# Patient Record
Sex: MALE | Race: BLACK OR AFRICAN AMERICAN | NOT HISPANIC OR LATINO | Employment: UNEMPLOYED | URBAN - METROPOLITAN AREA
[De-identification: names, ages, dates, MRNs, and addresses within clinical notes are randomized per-mention and may not be internally consistent; named-entity substitution may affect disease eponyms.]

---

## 2018-04-14 ENCOUNTER — HOSPITAL ENCOUNTER (EMERGENCY)
Facility: OTHER | Age: 53
Discharge: HOME | End: 2018-04-14
Attending: EMERGENCY MEDICINE
Payer: MEDICAID

## 2018-04-14 VITALS
RESPIRATION RATE: 18 BRPM | WEIGHT: 196 LBS | DIASTOLIC BLOOD PRESSURE: 108 MMHG | OXYGEN SATURATION: 95 % | HEART RATE: 98 BPM | TEMPERATURE: 97 F | SYSTOLIC BLOOD PRESSURE: 171 MMHG | BODY MASS INDEX: 31.5 KG/M2 | HEIGHT: 66 IN

## 2018-04-14 DIAGNOSIS — R03.0 ELEVATED BLOOD PRESSURE READING: ICD-10-CM

## 2018-04-14 DIAGNOSIS — H02.843 SWELLING OF EYELID, RIGHT: Primary | ICD-10-CM

## 2018-04-14 DIAGNOSIS — L30.9 DERMATITIS: ICD-10-CM

## 2018-04-14 PROCEDURE — 99283 EMERGENCY DEPT VISIT LOW MDM: CPT

## 2018-04-14 PROCEDURE — 25000003 PHARM REV CODE 250: Performed by: EMERGENCY MEDICINE

## 2018-04-14 RX ORDER — HYDROCHLOROTHIAZIDE 25 MG/1
25 TABLET ORAL DAILY
Qty: 30 TABLET | Refills: 0 | Status: SHIPPED | OUTPATIENT
Start: 2018-04-14 | End: 2019-04-14

## 2018-04-14 RX ORDER — HYDROCHLOROTHIAZIDE 25 MG/1
25 TABLET ORAL
Status: COMPLETED | OUTPATIENT
Start: 2018-04-14 | End: 2018-04-14

## 2018-04-14 RX ORDER — HYDROCORTISONE 1 %
CREAM (GRAM) TOPICAL
Qty: 30 G | Refills: 0 | Status: SHIPPED | OUTPATIENT
Start: 2018-04-14

## 2018-04-14 RX ORDER — DIPHENHYDRAMINE HCL 25 MG
25 CAPSULE ORAL
Status: COMPLETED | OUTPATIENT
Start: 2018-04-14 | End: 2018-04-14

## 2018-04-14 RX ORDER — HYDROCHLOROTHIAZIDE 25 MG/1
25 TABLET ORAL DAILY
COMMUNITY

## 2018-04-14 RX ADMIN — HYDROCHLOROTHIAZIDE 25 MG: 25 TABLET ORAL at 08:04

## 2018-04-14 RX ADMIN — DIPHENHYDRAMINE HYDROCHLORIDE 25 MG: 25 CAPSULE ORAL at 08:04

## 2018-04-14 NOTE — ED PROVIDER NOTES
Encounter Date: 4/14/2018    SCRIBE #1 NOTE: IHope am scribing for, and in the presence of, Dr. Sheffield.       History     Chief Complaint   Patient presents with    Facial Swelling     Pt reports swelling to right upper lid since this am. Reports discomfort.     Time seen by provider: 8:12 AM    This is a 53 y.o. Male, with a history of HTN, who presents with complaint of facial swelling that began today. Patient reports waking up with right side eyelid swelling. He reports associated eye itching, eye discharge, and vision changes. He admits to wearing eye glasses. He reports rash to the right forearm that developed today. He reports associated itching. He denies fever, chills, congestion, photophobia, headache. He reports noncompliance with antihypertensive medication because he does not have any refills.       The history is provided by the patient.     Review of patient's allergies indicates:  No Known Allergies  Past Medical History:   Diagnosis Date    Hypertension      History reviewed. No pertinent surgical history.  History reviewed. No pertinent family history.  Social History   Substance Use Topics    Smoking status: Never Smoker    Smokeless tobacco: Not on file    Alcohol use Not on file      Comment: occasionally     Review of Systems   Constitutional: Negative for chills and fever.   HENT: Positive for facial swelling (right eyelid). Negative for congestion and sore throat.    Eyes: Positive for discharge, itching and visual disturbance. Negative for photophobia.   Respiratory: Negative for shortness of breath.    Cardiovascular: Negative for chest pain.   Gastrointestinal: Negative for nausea and vomiting.   Genitourinary: Negative for dysuria.   Musculoskeletal: Negative for back pain.   Skin: Positive for rash (right forearm).   Neurological: Negative for weakness and headaches.   Hematological: Does not bruise/bleed easily.   Psychiatric/Behavioral: Negative for confusion.        Physical Exam     Initial Vitals [04/14/18 0724]   BP Pulse Resp Temp SpO2   (!) 171/108 98 18 97.2 °F (36.2 °C) 95 %      MAP       129         Physical Exam    Nursing note and vitals reviewed.  Constitutional: He appears well-developed and well-nourished. No distress.   HENT:   Head: Normocephalic and atraumatic.   Eyes:   Mild edema to the right upper eyelid. No conjunctival injection. No proptosis. No chemosis.    Neck: Normal range of motion. Neck supple.   Cardiovascular: Normal rate and normal heart sounds.   Pulmonary/Chest: Effort normal and breath sounds normal.   Abdominal: Soft. Normal appearance and bowel sounds are normal. There is no tenderness.   Musculoskeletal: Normal range of motion.   Neurological: He is alert and oriented to person, place, and time. He has normal strength. No cranial nerve deficit or sensory deficit.   Skin: Skin is warm and dry.   1 cm raised urticarial rash to right proximal forearm.    Psychiatric: He has a normal mood and affect. His behavior is normal. Thought content normal.         ED Course   Procedures  Labs Reviewed - No data to display          Medical Decision Making:   Initial Assessment:   Urgent evaluation of 54 yo M with htn, here with R eyelid swelling upon awakening without known injury, and itching sensation to R forearm after sleeping at [facility]. Exam with mild swelling to upperlid without discharge, conjunctivitis. Suspect contact dermatitis/pressure injury, and warm compresses suggested. Pt has no obvious evidence of bed bugs/cellulitis, will dc home w topical low dose steroid for extremity, refill htn meds. The patient's blood pressure is elevated here in the emergency department.  The patient has a history of hypertension and this is likely long-standing uncontrolled hypertension.  Based on the patient's presentation today I do not see any signs of endorgan damage representing hypertensive emergency.  I do not think the patient's  presentation necessitates further intervention in the Emergency Department to acutely decrease their blood pressure.  I have given the patient and/or their family specific return precautions and instructions to follow up with their regular doctor.                 Attending Attestation:           Physician Attestation for Scribe:  Physician Attestation Statement for Scribe #1: I, Dr. Sheffield, reviewed documentation, as scribed by Hope Mireles in my presence, and it is both accurate and complete.                    Clinical Impression:     1. Swelling of eyelid, right    2. Elevated blood pressure reading    3. Dermatitis          Disposition:   Disposition: Discharged  Condition: Angelina Sheffield MD  04/14/18 8053

## 2018-04-14 NOTE — ED NOTES
Pt reports he woke up this am with swelling to the right upper lid. Denies any trauma. Pt also reports [facility] the shelter and is out of his BP meds. Denies any s/s.

## 2021-09-14 ENCOUNTER — TELEPHONE (OUTPATIENT)
Dept: OPHTHALMOLOGY | Facility: CLINIC | Age: 56
End: 2021-09-14

## 2023-04-26 ENCOUNTER — TELEPHONE (OUTPATIENT)
Dept: UROLOGY | Facility: CLINIC | Age: 58
End: 2023-04-26
Payer: COMMERCIAL

## 2023-04-26 NOTE — TELEPHONE ENCOUNTER
Spoke with pt, pt is aware per sylwia we are calling to schedule sylwia next available so he can be seen due to his urinary problems.  Pt understood and is now scheduled for 5/16/23 @ 10:30am  Pt is aware of appt date and time and location.

## 2023-04-26 NOTE — TELEPHONE ENCOUNTER
----- Message from Pat Quintero NP sent at 4/26/2023  1:43 PM CDT -----  Regarding: FW: referral  Please schedule appt with me, next available.     Thanks  M   ----- Message -----  From: Bertha Florence MD  Sent: 4/26/2023   1:27 PM CDT  To: Pat Quintero NP  Subject: referral                                         Good afternoon,  Mr. Jain is one of my dialysis patients. He complained of slow urine stream and occasional dribbling. I'm not sure if this is BPH or just decreased UOP in setting of ESRD, but he thinks he had some prostate issues in the past so I figured it would be best to get checked out. I placed the referral order in Epic.   Thanks in advance,  Bertha

## 2023-05-15 NOTE — PROGRESS NOTES
"  Subjective:      Kvng Jain is a 58 y.o. male who presents for evaluation of post void dribbling.      Benign Prostatic Hypertrophy  Patient complains of lower urinary tract symptoms. He reports frequency, intermittency, nocturia three times a night, straining, and weak stream. He denies flank pain, GH, f/c/n/v.   Patient states symptoms are of severe severity. Onset of symptoms was several months ago and was gradual in onset. His AUA Symptom Score is, 26/6. He has no personal history and no family history of prostate cancer. He denies flank pain, gross hematuria, kidney stones, and recurrent UTI.  Treated with ciprofloxacin for culture proven UTI for/29; Enterococcus  Currently receiving dialysis  Completed treatment for culture proven UTI (4/29) Enterococcus    The following portions of the patient's history were reviewed and updated as appropriate: allergies, current medications, past family history, past medical history, past social history, past surgical history and problem list.    Review of Systems  Constitutional: no fever or chills  ENT: no nasal congestion or sore throat  Respiratory: no cough or shortness of breath  Cardiovascular: no chest pain or palpitations  Gastrointestinal: no nausea or vomiting, tolerating diet  Genitourinary: as per HPI  Hematologic/Lymphatic: no easy bruising or lymphadenopathy  Musculoskeletal: no arthralgias or myalgias  Neurological: no seizures or tremors  Behavioral/Psych: no auditory or visual hallucinations     Objective:   Vitals: BP (!) 154/98 (BP Location: Left arm, Patient Position: Sitting, BP Method: Small (Automatic))   Pulse 93   Resp 16   Ht 5' 6" (1.676 m)   Wt 79.2 kg (174 lb 7.9 oz)   BMI 28.16 kg/m²     Physical Exam   General: alert and oriented, no acute distress  Head: normocephalic, atraumatic  Neck: supple, no lymphadenopathy, normal ROM, no masses  Respiratory: Symmetric expansion, non-labored breathing  Cardiovascular: regular rate and rhythm, " nomal pulses, no peripheral edema  Abdomen: soft, non tender, non distended  Genitourinary: defer   Skin: normal coloration and turgor, no rashes, no suspicious skin lesions noted  Neuro: alert and oriented x3, no gross deficits  Psych: normal judgment and insight, normal mood/affect, and non-anxious    Physical Exam    Lab Review   Urinalysis demonstrates no specimen   Specimen:  Urine - Urine (substance)  Component 2 wk ago   Culture, Urine  40,000 CFU/mL Enterococcus  species Abnormal     Resulting Agency [facility]   Narrative  Performed by [facility]  Not normally considered a Uropathogen in this setting.  Specimen Collected: 04/29/23 04:57 Last Resulted: 04/30/23 11:44   Received From: [facility]  Result Received: 05/16/23 10:17    View Encounter     Recent Data from [facility]  Related to Urine culture  Component 04/29/23 04/22/21   Culture, Urine 40,000 CFU/mL Enterococcus  species Abnormal  No growth       Bladder Scan PVR: 33 cc     Assessment and Plan:   1. Benign prostatic hyperplasia with weak urinary stream  -- Discussed medical treatment options for BPH such as alpha-blockers (flomax) and 5-alpha reductase inhibitors (proscar) wishes to start both medications today. SE of both medications discussed     -- Discussed surgical options for treatment of BPH including bipolar TURP and Urolift, including the risks and benefits of each.    --PSA NA  --UA/UC      --will notify with results   --RTC in 1 month for medication assessment     This note is dictated on M*Modal word recognition program.  There are word recognition mistakes that are occasionally missed on review.

## 2023-05-16 ENCOUNTER — OFFICE VISIT (OUTPATIENT)
Dept: UROLOGY | Facility: CLINIC | Age: 58
End: 2023-05-16
Payer: MEDICARE

## 2023-05-16 VITALS
DIASTOLIC BLOOD PRESSURE: 98 MMHG | WEIGHT: 174.5 LBS | HEART RATE: 93 BPM | SYSTOLIC BLOOD PRESSURE: 154 MMHG | BODY MASS INDEX: 28.04 KG/M2 | HEIGHT: 66 IN | RESPIRATION RATE: 16 BRPM

## 2023-05-16 DIAGNOSIS — N40.1 BENIGN PROSTATIC HYPERPLASIA WITH WEAK URINARY STREAM: ICD-10-CM

## 2023-05-16 DIAGNOSIS — R39.12 BENIGN PROSTATIC HYPERPLASIA WITH WEAK URINARY STREAM: ICD-10-CM

## 2023-05-16 DIAGNOSIS — N18.6 END STAGE RENAL DISEASE: ICD-10-CM

## 2023-05-16 LAB — POC RESIDUAL URINE VOLUME: 33 ML (ref 0–100)

## 2023-05-16 PROCEDURE — 4010F ACE/ARB THERAPY RXD/TAKEN: CPT | Mod: CPTII,,, | Performed by: NURSE PRACTITIONER

## 2023-05-16 PROCEDURE — 4010F PR ACE/ARB THEARPY RXD/TAKEN: ICD-10-PCS | Mod: CPTII,,, | Performed by: NURSE PRACTITIONER

## 2023-05-16 PROCEDURE — 3008F BODY MASS INDEX DOCD: CPT | Mod: CPTII,,, | Performed by: NURSE PRACTITIONER

## 2023-05-16 PROCEDURE — 3080F DIAST BP >= 90 MM HG: CPT | Mod: CPTII,,, | Performed by: NURSE PRACTITIONER

## 2023-05-16 PROCEDURE — 1159F PR MEDICATION LIST DOCUMENTED IN MEDICAL RECORD: ICD-10-PCS | Mod: CPTII,,, | Performed by: NURSE PRACTITIONER

## 2023-05-16 PROCEDURE — 99999 PR PBB SHADOW E&M-EST. PATIENT-LVL IV: CPT | Mod: PBBFAC,,, | Performed by: NURSE PRACTITIONER

## 2023-05-16 PROCEDURE — 99204 OFFICE O/P NEW MOD 45 MIN: CPT | Mod: ,,, | Performed by: NURSE PRACTITIONER

## 2023-05-16 PROCEDURE — 1159F MED LIST DOCD IN RCRD: CPT | Mod: CPTII,,, | Performed by: NURSE PRACTITIONER

## 2023-05-16 PROCEDURE — 1160F PR REVIEW ALL MEDS BY PRESCRIBER/CLIN PHARMACIST DOCUMENTED: ICD-10-PCS | Mod: CPTII,,, | Performed by: NURSE PRACTITIONER

## 2023-05-16 PROCEDURE — 1160F RVW MEDS BY RX/DR IN RCRD: CPT | Mod: CPTII,,, | Performed by: NURSE PRACTITIONER

## 2023-05-16 PROCEDURE — 3080F PR MOST RECENT DIASTOLIC BLOOD PRESSURE >= 90 MM HG: ICD-10-PCS | Mod: CPTII,,, | Performed by: NURSE PRACTITIONER

## 2023-05-16 PROCEDURE — 3008F PR BODY MASS INDEX (BMI) DOCUMENTED: ICD-10-PCS | Mod: CPTII,,, | Performed by: NURSE PRACTITIONER

## 2023-05-16 PROCEDURE — 51798 POCT BLADDER SCAN: ICD-10-PCS | Mod: ,,, | Performed by: NURSE PRACTITIONER

## 2023-05-16 PROCEDURE — 99204 PR OFFICE/OUTPT VISIT, NEW, LEVL IV, 45-59 MIN: ICD-10-PCS | Mod: ,,, | Performed by: NURSE PRACTITIONER

## 2023-05-16 PROCEDURE — 3077F SYST BP >= 140 MM HG: CPT | Mod: CPTII,,, | Performed by: NURSE PRACTITIONER

## 2023-05-16 PROCEDURE — 99999 PR PBB SHADOW E&M-EST. PATIENT-LVL IV: ICD-10-PCS | Mod: PBBFAC,,, | Performed by: NURSE PRACTITIONER

## 2023-05-16 PROCEDURE — 51798 US URINE CAPACITY MEASURE: CPT | Mod: ,,, | Performed by: NURSE PRACTITIONER

## 2023-05-16 PROCEDURE — 3077F PR MOST RECENT SYSTOLIC BLOOD PRESSURE >= 140 MM HG: ICD-10-PCS | Mod: CPTII,,, | Performed by: NURSE PRACTITIONER

## 2023-05-16 RX ORDER — LOSARTAN POTASSIUM 50 MG/1
TABLET ORAL
COMMUNITY
Start: 2023-03-29

## 2023-05-16 RX ORDER — FINASTERIDE 5 MG/1
5 TABLET, FILM COATED ORAL DAILY
Qty: 30 TABLET | Refills: 11 | Status: SHIPPED | OUTPATIENT
Start: 2023-05-16 | End: 2024-05-15

## 2023-05-16 RX ORDER — ERGOCALCIFEROL 1.25 MG/1
50000 CAPSULE ORAL
COMMUNITY

## 2023-05-16 RX ORDER — TAMSULOSIN HYDROCHLORIDE 0.4 MG/1
0.4 CAPSULE ORAL DAILY
Qty: 30 CAPSULE | Refills: 11 | Status: SHIPPED | OUTPATIENT
Start: 2023-05-16 | End: 2024-05-10

## 2023-05-16 RX ORDER — CINACALCET 30 MG/1
30 TABLET, FILM COATED ORAL
COMMUNITY

## 2023-05-16 RX ORDER — FUROSEMIDE 40 MG/1
40 TABLET ORAL 2 TIMES DAILY
COMMUNITY

## 2023-05-16 RX ORDER — AMLODIPINE BESYLATE 10 MG/1
TABLET ORAL
COMMUNITY
Start: 2023-04-05

## 2023-05-16 RX ORDER — ISOSORBIDE DINITRATE 10 MG/1
10 TABLET ORAL 3 TIMES DAILY
COMMUNITY

## 2023-05-30 ENCOUNTER — TELEPHONE (OUTPATIENT)
Dept: UROLOGY | Facility: CLINIC | Age: 58
End: 2023-05-30
Payer: MEDICARE

## 2023-05-30 DIAGNOSIS — N40.1 BENIGN PROSTATIC HYPERPLASIA WITH WEAK URINARY STREAM: ICD-10-CM

## 2023-05-30 DIAGNOSIS — R39.12 BENIGN PROSTATIC HYPERPLASIA WITH WEAK URINARY STREAM: ICD-10-CM

## 2023-05-30 DIAGNOSIS — R30.0 DYSURIA: Primary | ICD-10-CM

## 2023-05-30 NOTE — TELEPHONE ENCOUNTER
Spoke with pt. Pt states he is has been having diarrhea for the last week and states he cannot control it. Pt states he is also vomiting. Pt is not able to do his dialysis for his recommended time and is concerned. Advised pt that he should reach out to his PCP but I will also send a message to Pat Quintero NP. All questions answered. Pt verbalized understanding.

## 2023-05-30 NOTE — TELEPHONE ENCOUNTER
Spoke with pt. Pt notified per Pat Quintero Np's advice. Pt states he would like to do a urine culture. Orders placed. All questions answered. Pt verbalized understanding.

## 2023-05-30 NOTE — TELEPHONE ENCOUNTER
----- Message from Barbi Ahmadi sent at 5/30/2023 10:53 AM CDT -----  Regarding: Urgent for pt, call back for advise from   Contact: @[phone]  Pt needs call back, advise from  Diarrhea is consistent and pressure in penis is releasing/ bubble forming in urine. Please call and advise @[phone]

## 2023-05-30 NOTE — TELEPHONE ENCOUNTER
He can drop off UC for assessment of UTI however I agree with your recs- contact PCP or go to ED for further evaluation    Thanks  VERONICA

## 2023-05-30 NOTE — TELEPHONE ENCOUNTER
----- Message from Parth Jv sent at 5/30/2023 10:36 AM CDT -----  Regarding: Speak to Nurse/Staff  Patient called in requesting to speak with provider/nurse regarding his health. He states a medication prescribed cleared the infection but states he is still sick. No other details provided. Requested a call back to discuss further.                          Contact: [phone]

## 2023-06-16 NOTE — PROGRESS NOTES
"Subjective:      Kvng Jain is a 58 y.o. male who returns today regarding his elevated PSA.     PSA obtained was elevated; 11.8. He presents today to discuss.   Reports that LUTS have resolved with daily Flomax and finasteride; denies SE    Benign Prostatic Hypertrophy  Patient complains of lower urinary tract symptoms. He reports frequency, intermittency, nocturia three times a night, straining, and weak stream. He denies flank pain, GH, f/c/n/v.   Patient states symptoms are of severe severity. Onset of symptoms was several months ago and was gradual in onset. His AUA Symptom Score is, 26/6. He has no personal history and no family history of prostate cancer. He denies flank pain, gross hematuria, kidney stones, and recurrent UTI.  Treated with ciprofloxacin for culture proven UTI for/29; Enterococcus  Currently receiving dialysis  Completed treatment for culture proven UTI (4/29) Enterococcus      The following portions of the patient's history were reviewed and updated as appropriate: allergies, current medications, past family history, past medical history, past social history, past surgical history and problem list.    Review of Systems  A comprehensive multipoint review of systems was negative except as otherwise stated in the HPI.     Objective:   Vitals: /87   Pulse 86   Ht 5' 6" (1.676 m)   Wt 78.9 kg (174 lb)   BMI 28.08 kg/m²     Physical Exam   General: alert and oriented, no acute distress  Respiratory: Symmetric expansion, non-labored breathing  Cardiovascular: regular rate and rhythm, no peripheral edema  Abdomen: soft, non distended  Skin: normal coloration and turgor, no rashes, no suspicious skin lesions noted  Neuro: no gross deficits  Psych: normal judgment and insight, normal mood/affect, and non-anxious    Lab Review   Urinalysis demonstrates no specimen     Assessment and Plan:   1. Benign prostatic hyperplasia with weak urinary stream  --continue flomax   --continue finasteride "     2. Elevated prostate specific antigen (PSA)  --Pt aware that PSA elevation maybe related to recent UTI; will reassess PSA today; if elevated will proceed with prostate MRI   - Prostate Specific Antigen, Diagnostic; Future  - MRI Prostate W W/O Contrast; Future    --will notify with results; fu based on results     This note is dictated on M*Modal word recognition program.  There are word recognition mistakes that are occasionally missed on review.

## 2023-06-19 ENCOUNTER — OFFICE VISIT (OUTPATIENT)
Dept: UROLOGY | Facility: CLINIC | Age: 58
End: 2023-06-19
Payer: MEDICARE

## 2023-06-19 VITALS
SYSTOLIC BLOOD PRESSURE: 128 MMHG | WEIGHT: 174 LBS | HEART RATE: 86 BPM | HEIGHT: 66 IN | BODY MASS INDEX: 27.97 KG/M2 | DIASTOLIC BLOOD PRESSURE: 87 MMHG

## 2023-06-19 DIAGNOSIS — N40.1 BENIGN PROSTATIC HYPERPLASIA WITH WEAK URINARY STREAM: Primary | ICD-10-CM

## 2023-06-19 DIAGNOSIS — R39.12 BENIGN PROSTATIC HYPERPLASIA WITH WEAK URINARY STREAM: Primary | ICD-10-CM

## 2023-06-19 DIAGNOSIS — R97.20 ELEVATED PROSTATE SPECIFIC ANTIGEN (PSA): ICD-10-CM

## 2023-06-19 PROCEDURE — 3079F PR MOST RECENT DIASTOLIC BLOOD PRESSURE 80-89 MM HG: ICD-10-PCS | Mod: CPTII,S$GLB,, | Performed by: NURSE PRACTITIONER

## 2023-06-19 PROCEDURE — 3008F PR BODY MASS INDEX (BMI) DOCUMENTED: ICD-10-PCS | Mod: CPTII,S$GLB,, | Performed by: NURSE PRACTITIONER

## 2023-06-19 PROCEDURE — 1160F RVW MEDS BY RX/DR IN RCRD: CPT | Mod: CPTII,S$GLB,, | Performed by: NURSE PRACTITIONER

## 2023-06-19 PROCEDURE — 1160F PR REVIEW ALL MEDS BY PRESCRIBER/CLIN PHARMACIST DOCUMENTED: ICD-10-PCS | Mod: CPTII,S$GLB,, | Performed by: NURSE PRACTITIONER

## 2023-06-19 PROCEDURE — 3008F BODY MASS INDEX DOCD: CPT | Mod: CPTII,S$GLB,, | Performed by: NURSE PRACTITIONER

## 2023-06-19 PROCEDURE — 3074F SYST BP LT 130 MM HG: CPT | Mod: CPTII,S$GLB,, | Performed by: NURSE PRACTITIONER

## 2023-06-19 PROCEDURE — 99999 PR PBB SHADOW E&M-EST. PATIENT-LVL III: CPT | Mod: PBBFAC,,, | Performed by: NURSE PRACTITIONER

## 2023-06-19 PROCEDURE — 3079F DIAST BP 80-89 MM HG: CPT | Mod: CPTII,S$GLB,, | Performed by: NURSE PRACTITIONER

## 2023-06-19 PROCEDURE — 4010F PR ACE/ARB THEARPY RXD/TAKEN: ICD-10-PCS | Mod: CPTII,S$GLB,, | Performed by: NURSE PRACTITIONER

## 2023-06-19 PROCEDURE — 1159F MED LIST DOCD IN RCRD: CPT | Mod: CPTII,S$GLB,, | Performed by: NURSE PRACTITIONER

## 2023-06-19 PROCEDURE — 99214 OFFICE O/P EST MOD 30 MIN: CPT | Mod: S$GLB,,, | Performed by: NURSE PRACTITIONER

## 2023-06-19 PROCEDURE — 3074F PR MOST RECENT SYSTOLIC BLOOD PRESSURE < 130 MM HG: ICD-10-PCS | Mod: CPTII,S$GLB,, | Performed by: NURSE PRACTITIONER

## 2023-06-19 PROCEDURE — 99999 PR PBB SHADOW E&M-EST. PATIENT-LVL III: ICD-10-PCS | Mod: PBBFAC,,, | Performed by: NURSE PRACTITIONER

## 2023-06-19 PROCEDURE — 1159F PR MEDICATION LIST DOCUMENTED IN MEDICAL RECORD: ICD-10-PCS | Mod: CPTII,S$GLB,, | Performed by: NURSE PRACTITIONER

## 2023-06-19 PROCEDURE — 99214 PR OFFICE/OUTPT VISIT, EST, LEVL IV, 30-39 MIN: ICD-10-PCS | Mod: S$GLB,,, | Performed by: NURSE PRACTITIONER

## 2023-06-19 PROCEDURE — 4010F ACE/ARB THERAPY RXD/TAKEN: CPT | Mod: CPTII,S$GLB,, | Performed by: NURSE PRACTITIONER

## 2023-06-19 RX ORDER — SEVELAMER CARBONATE 800 MG/1
800 TABLET, FILM COATED ORAL 3 TIMES DAILY
COMMUNITY
Start: 2023-06-05

## 2023-06-20 ENCOUNTER — TELEPHONE (OUTPATIENT)
Dept: UROLOGY | Facility: CLINIC | Age: 58
End: 2023-06-20
Payer: MEDICARE

## 2023-06-20 NOTE — TELEPHONE ENCOUNTER
----- Message from Alanna Kearney sent at 6/20/2023 12:45 PM CDT -----  Regarding: returned missed call  The patient called returning missed call  Was read the portal message but has additional question regarding if that was the cause of the UTI  Would like to discuss at your convenience     No further information provided      Patient can be contacted @#   [phone] (home)

## 2023-06-20 NOTE — TELEPHONE ENCOUNTER
----- Message from Pat Quintero NP sent at 6/20/2023 10:37 AM CDT -----  Please call patient and let him know that his repeat PSA is 2.9 which is normal and we do not need to proceed with a prostate MRI.  Please cancel Prostate MRI.   thanks  VERONICA

## 2024-04-05 DIAGNOSIS — N40.1 BENIGN PROSTATIC HYPERPLASIA WITH WEAK URINARY STREAM: ICD-10-CM

## 2024-04-05 DIAGNOSIS — R39.12 BENIGN PROSTATIC HYPERPLASIA WITH WEAK URINARY STREAM: ICD-10-CM

## 2024-04-05 RX ORDER — TAMSULOSIN HYDROCHLORIDE 0.4 MG/1
0.4 CAPSULE ORAL DAILY
Qty: 30 CAPSULE | Refills: 11 | Status: SHIPPED | OUTPATIENT
Start: 2024-04-05 | End: 2025-03-31

## 2024-04-05 RX ORDER — FINASTERIDE 5 MG/1
5 TABLET, FILM COATED ORAL DAILY
Qty: 30 TABLET | Refills: 11 | Status: SHIPPED | OUTPATIENT
Start: 2024-04-05 | End: 2025-04-05